# Patient Record
Sex: MALE | Race: WHITE | NOT HISPANIC OR LATINO | Employment: OTHER | URBAN - METROPOLITAN AREA
[De-identification: names, ages, dates, MRNs, and addresses within clinical notes are randomized per-mention and may not be internally consistent; named-entity substitution may affect disease eponyms.]

---

## 2023-10-19 DIAGNOSIS — E78.5 HYPERLIPIDEMIA, UNSPECIFIED HYPERLIPIDEMIA TYPE: ICD-10-CM

## 2023-10-19 DIAGNOSIS — E78.1 HYPERTRIGLYCERIDEMIA: Primary | ICD-10-CM

## 2023-10-19 PROBLEM — R00.1 BRADYCARDIA, SINUS: Status: ACTIVE | Noted: 2023-10-19

## 2023-10-19 PROBLEM — E66.3 OVERWEIGHT: Status: ACTIVE | Noted: 2023-10-19

## 2023-10-19 PROBLEM — I35.1 MODERATE AORTIC INSUFFICIENCY: Status: ACTIVE | Noted: 2023-10-19

## 2023-10-19 PROBLEM — I25.10 CORONARY ARTERY CALCIFICATION: Status: ACTIVE | Noted: 2023-10-19

## 2023-10-19 PROBLEM — I10 HTN (HYPERTENSION), BENIGN: Status: ACTIVE | Noted: 2023-10-19

## 2023-10-19 PROBLEM — I25.84 CORONARY ARTERY CALCIFICATION: Status: ACTIVE | Noted: 2023-10-19

## 2023-10-19 PROBLEM — I77.810 DILATED AORTIC ROOT (CMS-HCC): Status: ACTIVE | Noted: 2023-10-19

## 2023-10-19 PROBLEM — G47.33 OBSTRUCTIVE SLEEP APNEA: Status: ACTIVE | Noted: 2023-10-19

## 2023-10-21 RX ORDER — COLESEVELAM 180 1/1
1250 TABLET ORAL 2 TIMES DAILY
Qty: 360 TABLET | Refills: 0 | Status: SHIPPED | OUTPATIENT
Start: 2023-10-21

## 2023-12-13 PROBLEM — R35.89 POLYURIA: Status: ACTIVE | Noted: 2022-01-14

## 2023-12-13 PROBLEM — K27.9 PUD (PEPTIC ULCER DISEASE): Status: ACTIVE | Noted: 2020-04-21

## 2023-12-13 PROBLEM — K57.90 DIVERTICULOSIS: Status: ACTIVE | Noted: 2023-12-13

## 2023-12-13 PROBLEM — M94.0 COSTOCHONDRITIS: Status: ACTIVE | Noted: 2022-04-21

## 2023-12-13 PROBLEM — Z86.0100 HISTORY OF COLON POLYPS: Status: ACTIVE | Noted: 2023-12-13

## 2023-12-13 PROBLEM — R21 PENILE RASH: Status: ACTIVE | Noted: 2022-04-21

## 2023-12-13 PROBLEM — D36.9 TUBULAR ADENOMA: Status: ACTIVE | Noted: 2023-12-13

## 2023-12-13 PROBLEM — I25.10 ARTERIOSCLEROSIS OF CORONARY ARTERY: Status: ACTIVE | Noted: 2022-11-18

## 2023-12-13 PROBLEM — G47.33 OSA (OBSTRUCTIVE SLEEP APNEA): Status: ACTIVE | Noted: 2022-01-14

## 2023-12-13 PROBLEM — M54.2 NECK PAIN: Status: ACTIVE | Noted: 2022-04-21

## 2023-12-13 PROBLEM — K62.5 RECTAL BLEEDING: Status: ACTIVE | Noted: 2023-12-13

## 2023-12-13 PROBLEM — R91.1 LUNG NODULE: Status: ACTIVE | Noted: 2023-12-13

## 2023-12-13 PROBLEM — I25.84 CORONARY ARTERY CALCIFICATION: Status: ACTIVE | Noted: 2022-11-18

## 2023-12-13 PROBLEM — Z86.010 HISTORY OF COLON POLYPS: Status: ACTIVE | Noted: 2023-12-13

## 2023-12-13 PROBLEM — R35.1 NOCTURIA MORE THAN TWICE PER NIGHT: Status: ACTIVE | Noted: 2018-03-29

## 2023-12-13 PROBLEM — J34.89 NASAL OBSTRUCTION: Status: ACTIVE | Noted: 2023-12-13

## 2023-12-13 PROBLEM — I38 VALVULAR REGURGITATION: Status: ACTIVE | Noted: 2023-03-29

## 2023-12-13 PROBLEM — M19.90 OSTEOARTHRITIS: Status: ACTIVE | Noted: 2020-06-02

## 2023-12-13 PROBLEM — D53.9 NUTRITIONAL ANEMIA, UNSPECIFIED: Status: ACTIVE | Noted: 2020-04-17

## 2023-12-13 PROBLEM — K21.9 GASTROESOPHAGEAL REFLUX DISEASE WITHOUT ESOPHAGITIS: Status: ACTIVE | Noted: 2020-04-06

## 2023-12-13 PROBLEM — I71.43 INFRARENAL ABDOMINAL AORTIC ANEURYSM (AAA) WITHOUT RUPTURE (CMS-HCC): Status: ACTIVE | Noted: 2023-03-29

## 2023-12-13 PROBLEM — N40.0 BENIGN PROSTATIC HYPERPLASIA: Status: ACTIVE | Noted: 2021-05-28

## 2023-12-13 PROBLEM — F41.9 ANXIETY DISORDER: Status: ACTIVE | Noted: 2021-03-25

## 2023-12-13 PROBLEM — R00.2 PALPITATIONS: Status: ACTIVE | Noted: 2023-12-13

## 2023-12-14 ENCOUNTER — OFFICE VISIT (OUTPATIENT)
Dept: CARDIOLOGY | Facility: CLINIC | Age: 83
End: 2023-12-14
Payer: MEDICARE

## 2023-12-14 VITALS
SYSTOLIC BLOOD PRESSURE: 132 MMHG | DIASTOLIC BLOOD PRESSURE: 60 MMHG | WEIGHT: 177 LBS | BODY MASS INDEX: 28.57 KG/M2 | OXYGEN SATURATION: 98 % | HEART RATE: 61 BPM | RESPIRATION RATE: 16 BRPM

## 2023-12-14 DIAGNOSIS — E78.00 PURE HYPERCHOLESTEROLEMIA: ICD-10-CM

## 2023-12-14 DIAGNOSIS — I25.84 CORONARY ARTERY CALCIFICATION: ICD-10-CM

## 2023-12-14 DIAGNOSIS — G47.33 OSA (OBSTRUCTIVE SLEEP APNEA): ICD-10-CM

## 2023-12-14 DIAGNOSIS — I77.810 DILATED AORTIC ROOT (CMS-HCC): ICD-10-CM

## 2023-12-14 DIAGNOSIS — I35.1 MODERATE AORTIC INSUFFICIENCY: ICD-10-CM

## 2023-12-14 DIAGNOSIS — I25.10 CORONARY ARTERY CALCIFICATION: ICD-10-CM

## 2023-12-14 DIAGNOSIS — I71.20 THORACIC AORTIC ANEURYSM WITHOUT RUPTURE, UNSPECIFIED PART (CMS-HCC): ICD-10-CM

## 2023-12-14 DIAGNOSIS — R00.1 BRADYCARDIA, SINUS: Primary | ICD-10-CM

## 2023-12-14 DIAGNOSIS — I10 HTN (HYPERTENSION), BENIGN: ICD-10-CM

## 2023-12-14 DIAGNOSIS — E78.1 HYPERTRIGLYCERIDEMIA: ICD-10-CM

## 2023-12-14 PROCEDURE — 99214 OFFICE O/P EST MOD 30 MIN: CPT | Performed by: INTERNAL MEDICINE

## 2023-12-14 PROCEDURE — 3078F DIAST BP <80 MM HG: CPT | Performed by: INTERNAL MEDICINE

## 2023-12-14 PROCEDURE — 1036F TOBACCO NON-USER: CPT | Performed by: INTERNAL MEDICINE

## 2023-12-14 PROCEDURE — 1159F MED LIST DOCD IN RCRD: CPT | Performed by: INTERNAL MEDICINE

## 2023-12-14 PROCEDURE — 3075F SYST BP GE 130 - 139MM HG: CPT | Performed by: INTERNAL MEDICINE

## 2023-12-14 PROCEDURE — 1160F RVW MEDS BY RX/DR IN RCRD: CPT | Performed by: INTERNAL MEDICINE

## 2023-12-14 PROCEDURE — 1126F AMNT PAIN NOTED NONE PRSNT: CPT | Performed by: INTERNAL MEDICINE

## 2023-12-14 NOTE — PROGRESS NOTES
Chief Complaint:   Follow-up (6 month)     History Of Present Illness:    Sabina Henry is a 83 y.o. male presenting with  CV dz.  Active-walks 1 hour daily    Patient denies chest pain/SOB/palpitations/dizziness/lightheadedness/edema/claudication       Last Recorded Vitals:  Vitals:    12/14/23 1347 12/14/23 1419   BP: 144/70 132/60   BP Location: Left arm    Patient Position: Sitting    Pulse: 61    Resp: 16    SpO2: 98%    Weight: 80.3 kg (177 lb)             Allergies:  Penicillins    Outpatient Medications:  Current Outpatient Medications   Medication Instructions    amLODIPine (NORVASC) 10 mg, oral, Daily    aspirin 81 mg EC tablet 4 tablets, oral, Weekly    Bifidobacterium infantis (Align) 4 mg capsule 1 capsule, oral, Daily    colesevelam (WELCHOL) 1,250 mg, oral, 2 times daily    metoprolol succinate XL (TOPROL-XL) 50 mg, oral, Daily    olmesartan (BENICAR) 40 mg, oral, Daily    omega-3 acid ethyl esters (LOVAZA) 2 g, oral, 2 times daily       Physical Exam:  Constitutional:       Appearance: Healthy appearance. Not in distress.   Neck:      Vascular: No JVR. JVD normal.   Pulmonary:      Effort: Pulmonary effort is normal.      Breath sounds: Normal breath sounds. No wheezing. No rhonchi. No rales.   Chest:      Chest wall: Not tender to palpatation.   Cardiovascular:      PMI at left midclavicular line. Normal rate. Regular rhythm. Normal S1. Normal S2.       Murmurs: There is no murmur.      No gallop.  No click. No rub.   Pulses:     Intact distal pulses.   Edema:     Peripheral edema absent.   Abdominal:      General: Bowel sounds are normal.      Palpations: Abdomen is soft.      Tenderness: There is no abdominal tenderness.   Musculoskeletal: Normal range of motion.         General: No tenderness. Skin:     General: Skin is warm and dry.   Neurological:      General: No focal deficit present.      Mental Status: Alert and oriented to person, place and time.          Last Labs:  CBC -  Lab Results    Component Value Date    WBC 6.5 03/24/2021    HGB 14.1 03/24/2021    HCT 40.9 (L) 03/24/2021    MCV 89 03/24/2021     03/24/2021       CMP -  Lab Results   Component Value Date    CALCIUM 10.3 04/15/2021    PROT 7.4 03/24/2021    ALBUMIN 4.4 03/24/2021    AST 14 03/24/2021    ALT 12 03/24/2021    ALKPHOS 96 03/24/2021    BILITOT 0.4 03/24/2021       LIPID PANEL -   Lab Results   Component Value Date    CHOL 167 05/17/2021    TRIG 192 (H) 05/17/2021    HDL 40.9 05/17/2021    CHHDL 4.1 05/17/2021    LDLF 88 05/17/2021    VLDL 38 05/17/2021       RENAL FUNCTION PANEL -   Lab Results   Component Value Date    GLUCOSE 114 (H) 04/15/2021     04/15/2021    K 4.4 04/15/2021     04/15/2021    CO2 30 04/15/2021    ANIONGAP 10 04/15/2021    BUN 15 04/15/2021    CREATININE 0.88 04/15/2021    CALCIUM 10.3 04/15/2021    ALBUMIN 4.4 03/24/2021        Lab Results   Component Value Date    BNP 74 04/05/2020           Lab review: I have personally reviewed the laboratory result(s)       Problem List Items Addressed This Visit       Bradycardia, sinus - Primary    Overview     Patient with EP/of Mobitz II AV block on event monitor. Had pauses up to 4.5 seconds. Occurred at night. Suspect related to sleep apnea.   Decreased beta blocker   On treatment for sleep apnea          Coronary artery calcification    Overview     Noted initially on 4/2020 chest CT. Reported as severe on 4/2021 chest CT.  Nl 6/2021 stress test.   Denies angina despite good activity level.   On beta blocker.   On ASA.   Has been statin intolerant.  Follow.         Dilated aortic root (CMS/HCC)    Overview     Stable at 4.1 cm per 4/2021 CT   Maintain good BP control         HTN (hypertension), benign    Overview     BP OK in office today   Had stopped hydrochlorothiazide in the past b/c of dehydration / hematuria  Follow          Hyperlipidemia    Overview     Has not tolerated statins - on Welchol and Lovaza ... Declined change in statin in  past   4/2022 LKNPY=621 with VLL=320   12/2022 XNKPS=228, HDL=48-recheck         Hypertriglyceridemia    Overview     On Lovaza ... 4/2022 BI=354         Moderate aortic insufficiency    Overview     Noted on 8/2017 echo. Stable per 4/2021 study.  8/2023 echo with NL LV size/systolic fxn and moderate AI         LYN (obstructive sleep apnea)    Overview     Severe per 5/2021 sleep study  Wearing CPAP         Thoracic aortic aneurysm (TAA) (CMS/HCC)    Overview     4.1 CM per 4/2021 CT  Stable at 4.1 cm per 8/2023 echo  Follow             Stay active  Bring in cholesterol readings    Santy Awad, DO

## 2024-01-15 DIAGNOSIS — E78.5 HYPERLIPIDEMIA, UNSPECIFIED: ICD-10-CM

## 2024-01-15 RX ORDER — OMEGA-3-ACID ETHYL ESTERS 1 G/1
2 CAPSULE, LIQUID FILLED ORAL 2 TIMES DAILY
Qty: 90 CAPSULE | Refills: 3 | Status: SHIPPED | OUTPATIENT
Start: 2024-01-15

## 2024-06-17 ENCOUNTER — APPOINTMENT (OUTPATIENT)
Dept: CARDIOLOGY | Facility: CLINIC | Age: 84
End: 2024-06-17
Payer: MEDICARE

## 2024-06-17 PROBLEM — G89.29 CHRONIC RIGHT SHOULDER PAIN: Status: ACTIVE | Noted: 2024-03-22

## 2024-06-17 PROBLEM — E66.9 OBESITY: Status: ACTIVE | Noted: 2023-12-20

## 2024-06-17 PROBLEM — M25.511 CHRONIC RIGHT SHOULDER PAIN: Status: ACTIVE | Noted: 2024-03-22

## 2024-07-02 ENCOUNTER — APPOINTMENT (OUTPATIENT)
Dept: CARDIOLOGY | Facility: CLINIC | Age: 84
End: 2024-07-02
Payer: MEDICARE

## 2024-07-02 VITALS
DIASTOLIC BLOOD PRESSURE: 58 MMHG | SYSTOLIC BLOOD PRESSURE: 155 MMHG | OXYGEN SATURATION: 97 % | WEIGHT: 178 LBS | BODY MASS INDEX: 28.73 KG/M2 | HEART RATE: 60 BPM

## 2024-07-02 DIAGNOSIS — G47.33 OSA (OBSTRUCTIVE SLEEP APNEA): ICD-10-CM

## 2024-07-02 DIAGNOSIS — R00.1 BRADYCARDIA, SINUS: Primary | ICD-10-CM

## 2024-07-02 DIAGNOSIS — I35.1 MODERATE AORTIC INSUFFICIENCY: ICD-10-CM

## 2024-07-02 DIAGNOSIS — I10 HTN (HYPERTENSION), BENIGN: ICD-10-CM

## 2024-07-02 DIAGNOSIS — E78.00 PURE HYPERCHOLESTEROLEMIA: ICD-10-CM

## 2024-07-02 DIAGNOSIS — I71.20 THORACIC AORTIC ANEURYSM WITHOUT RUPTURE, UNSPECIFIED PART (CMS-HCC): ICD-10-CM

## 2024-07-02 DIAGNOSIS — E78.5 HYPERLIPIDEMIA, UNSPECIFIED: ICD-10-CM

## 2024-07-02 DIAGNOSIS — I25.10 CORONARY ARTERY CALCIFICATION: ICD-10-CM

## 2024-07-02 DIAGNOSIS — I77.810 DILATED AORTIC ROOT (CMS-HCC): ICD-10-CM

## 2024-07-02 DIAGNOSIS — E78.1 HYPERTRIGLYCERIDEMIA: ICD-10-CM

## 2024-07-02 DIAGNOSIS — I25.84 CORONARY ARTERY CALCIFICATION: ICD-10-CM

## 2024-07-02 PROCEDURE — 93000 ELECTROCARDIOGRAM COMPLETE: CPT | Performed by: INTERNAL MEDICINE

## 2024-07-02 PROCEDURE — 1159F MED LIST DOCD IN RCRD: CPT | Performed by: INTERNAL MEDICINE

## 2024-07-02 PROCEDURE — 1036F TOBACCO NON-USER: CPT | Performed by: INTERNAL MEDICINE

## 2024-07-02 PROCEDURE — 99214 OFFICE O/P EST MOD 30 MIN: CPT | Performed by: INTERNAL MEDICINE

## 2024-07-02 PROCEDURE — 1160F RVW MEDS BY RX/DR IN RCRD: CPT | Performed by: INTERNAL MEDICINE

## 2024-07-02 PROCEDURE — 3078F DIAST BP <80 MM HG: CPT | Performed by: INTERNAL MEDICINE

## 2024-07-02 PROCEDURE — 3077F SYST BP >= 140 MM HG: CPT | Performed by: INTERNAL MEDICINE

## 2024-07-02 RX ORDER — CLOTRIMAZOLE AND BETAMETHASONE DIPROPIONATE 10; .64 MG/G; MG/G
CREAM TOPICAL
COMMUNITY
Start: 2024-06-21

## 2024-07-02 RX ORDER — FLUTICASONE PROPIONATE 0.5 MG/G
CREAM TOPICAL
COMMUNITY
Start: 2024-06-21

## 2024-07-02 RX ORDER — FUROSEMIDE 20 MG/1
1 TABLET ORAL
COMMUNITY
Start: 2024-03-25 | End: 2024-07-02 | Stop reason: WASHOUT

## 2024-07-02 RX ORDER — OMEGA-3-ACID ETHYL ESTERS 1 G/1
2 CAPSULE, LIQUID FILLED ORAL 2 TIMES DAILY
Qty: 360 CAPSULE | Refills: 3 | Status: SHIPPED | OUTPATIENT
Start: 2024-07-02 | End: 2025-07-02

## 2024-07-02 NOTE — PROGRESS NOTES
Chief Complaint:   Follow-up (Patient is here for a follow up )     History Of Present Illness:    Sabina Henry is a 84 y.o. male presenting with  CV dz.  Active-walks 1 hour daily  Feels good    Patient denies chest pain/SOB/palpitations/dizziness/lightheadedness/edema/claudication       Last Recorded Vitals:  Vitals:    07/02/24 1448 07/02/24 1526   BP: 162/71 155/58   BP Location: Left arm    Patient Position: Sitting    BP Cuff Size: Adult    Pulse: 60    SpO2: 97%    Weight: 80.7 kg (178 lb)             Allergies:  Penicillins    Outpatient Medications:  Current Outpatient Medications   Medication Instructions    amLODIPine (NORVASC) 10 mg, oral, Daily    aspirin 81 mg EC tablet 4 tablets, oral, Once Weekly    clotrimazole-betamethasone (Lotrisone) cream apply a thin layer to affected area 2 times a day    colesevelam (WELCHOL) 1,250 mg, oral, 2 times daily    fluticasone (Cutivate) 0.05 % cream Apply topically to affected area 2 times daily    metoprolol succinate XL (TOPROL-XL) 50 mg, oral, Daily    olmesartan (BENICAR) 40 mg, oral, Daily    omega-3 acid ethyl esters (LOVAZA) 2 g, oral, 2 times daily       Physical Exam:  Constitutional:       Appearance: Healthy appearance. Not in distress.   Neck:      Vascular: No JVR. JVD normal.   Pulmonary:      Effort: Pulmonary effort is normal.      Breath sounds: Normal breath sounds. No wheezing. No rhonchi. No rales.   Chest:      Chest wall: Not tender to palpatation.   Cardiovascular:      PMI at left midclavicular line. Normal rate. Regular rhythm. Normal S1. Normal S2.       Murmurs: There is no murmur.      No gallop.  No click. No rub.   Pulses:     Intact distal pulses.   Edema:     Peripheral edema absent.   Abdominal:      General: Bowel sounds are normal.      Palpations: Abdomen is soft.      Tenderness: There is no abdominal tenderness.   Musculoskeletal: Normal range of motion.         General: No tenderness. Skin:     General: Skin is warm and dry.    Neurological:      General: No focal deficit present.      Mental Status: Alert and oriented to person, place and time.          Last Labs:  CBC -  Lab Results   Component Value Date    WBC 6.5 03/24/2021    HGB 14.1 03/24/2021    HCT 40.9 (L) 03/24/2021    MCV 89 03/24/2021     03/24/2021       CMP -  Lab Results   Component Value Date    CALCIUM 10.3 04/15/2021    PROT 7.4 03/24/2021    ALBUMIN 4.4 03/24/2021    AST 14 03/24/2021    ALT 12 03/24/2021    ALKPHOS 96 03/24/2021    BILITOT 0.4 03/24/2021       LIPID PANEL -   Lab Results   Component Value Date    CHOL 167 05/17/2021    TRIG 192 (H) 05/17/2021    HDL 40.9 05/17/2021    CHHDL 4.1 05/17/2021    LDLF 88 05/17/2021    VLDL 38 05/17/2021 5/2024  T 151  H 49      RENAL FUNCTION PANEL -   Lab Results   Component Value Date    GLUCOSE 114 (H) 04/15/2021     04/15/2021    K 4.4 04/15/2021     04/15/2021    CO2 30 04/15/2021    ANIONGAP 10 04/15/2021    BUN 15 04/15/2021    CREATININE 0.88 04/15/2021    CALCIUM 10.3 04/15/2021    ALBUMIN 4.4 03/24/2021        Lab Results   Component Value Date    BNP 74 04/05/2020           Lab review: I have personally reviewed the laboratory result(s)       Problem List Items Addressed This Visit       Bradycardia, sinus - Primary    Overview     Patient with EP/of Mobitz II AV block on event monitor. Had pauses up to 4.5 seconds. Occurred at night. Suspect related to sleep apnea.   Decreased beta blocker   On treatment for sleep apnea          Coronary artery calcification    Overview     Noted initially on 4/2020 chest CT. Reported as severe on 4/2021 chest CT.  NL 6/2021 stress test.   Denies angina despite good activity level.   On beta blocker.   On ASA.   Has been statin intolerant.  Follow.         Relevant Orders    ECG 12 Lead (Completed)    HTN (hypertension), benign    Overview     BP elevated in office today however generally lower at home thus NO med changes  Had stopped hydrochlorothiazide  in the past b/c of dehydration / hematuria  Follow          Hyperlipidemia    Overview     Has not tolerated statins - on Welchol and Lovaza ... Declined change in statin in past   4/2022 ZBSNN=432 with TDM=883   12/2022 EWGBA=459, HDL=48  5/2024 screening with BXNSN=047         Hypertriglyceridemia    Overview     On Lovaza ... 4/2022 MR=600         Moderate aortic insufficiency    Overview     Noted on 8/2017 echo. Stable per 4/2021 study.  8/2023 echo with NL LV size/systolic fxn and moderate AINo murmur on exam         LYN (obstructive sleep apnea)    Overview     Severe per 5/2021 sleep study  Wearing CPAP         Thoracic aortic aneurysm (TAA) (CMS-HCC)    Overview     4.1 CM per 4/2021 CT  Stable at 4.1 cm per 8/2023 echo  Follow           Other Visit Diagnoses       Dilated aortic root (CMS-HCC)        Hyperlipidemia, unspecified                Stay active      Santy Awad, DO

## 2024-08-31 ENCOUNTER — HOSPITAL ENCOUNTER (EMERGENCY)
Facility: HOSPITAL | Age: 84
Discharge: HOME | End: 2024-09-01
Payer: MEDICARE

## 2024-08-31 DIAGNOSIS — T50.901A ACCIDENTAL DRUG OVERDOSE, INITIAL ENCOUNTER: Primary | ICD-10-CM

## 2024-08-31 PROCEDURE — 99283 EMERGENCY DEPT VISIT LOW MDM: CPT

## 2024-08-31 ASSESSMENT — COLUMBIA-SUICIDE SEVERITY RATING SCALE - C-SSRS
6. HAVE YOU EVER DONE ANYTHING, STARTED TO DO ANYTHING, OR PREPARED TO DO ANYTHING TO END YOUR LIFE?: NO
1. IN THE PAST MONTH, HAVE YOU WISHED YOU WERE DEAD OR WISHED YOU COULD GO TO SLEEP AND NOT WAKE UP?: NO
2. HAVE YOU ACTUALLY HAD ANY THOUGHTS OF KILLING YOURSELF?: NO

## 2024-08-31 ASSESSMENT — PAIN SCALES - GENERAL: PAINLEVEL_OUTOF10: 0 - NO PAIN

## 2024-08-31 ASSESSMENT — PAIN - FUNCTIONAL ASSESSMENT: PAIN_FUNCTIONAL_ASSESSMENT: 0-10

## 2024-09-01 VITALS
RESPIRATION RATE: 17 BRPM | HEART RATE: 62 BPM | SYSTOLIC BLOOD PRESSURE: 146 MMHG | HEIGHT: 65 IN | DIASTOLIC BLOOD PRESSURE: 72 MMHG | BODY MASS INDEX: 28.66 KG/M2 | WEIGHT: 172 LBS | TEMPERATURE: 98.2 F | OXYGEN SATURATION: 97 %

## 2024-09-01 ASSESSMENT — LIFESTYLE VARIABLES
EVER FELT BAD OR GUILTY ABOUT YOUR DRINKING: NO
EVER HAD A DRINK FIRST THING IN THE MORNING TO STEADY YOUR NERVES TO GET RID OF A HANGOVER: NO
HAVE PEOPLE ANNOYED YOU BY CRITICIZING YOUR DRINKING: NO
HAVE YOU EVER FELT YOU SHOULD CUT DOWN ON YOUR DRINKING: NO
TOTAL SCORE: 0

## 2024-09-01 ASSESSMENT — PAIN SCALES - GENERAL
PAINLEVEL_OUTOF10: 0 - NO PAIN
PAINLEVEL_OUTOF10: 0 - NO PAIN

## 2024-09-01 NOTE — ED TRIAGE NOTES
85 y/o male concerned because he took 2-625mg tablets of colesevelam at 2000 hours tonight and then took same dose at 2300 hours tonight by mistake. Patient denies any complaints at time of triage.

## 2024-09-01 NOTE — ED PROVIDER NOTES
HPI   Chief Complaint   Patient presents with    Drug Overdose       84-year-old male presents for an unintentional drug overdose.  Patient states that he took an extra dose of his cholesterol medication this evening accidentally.  He reports he took his first dose around 8 PM and his second dose around 11 PM.  He reports feeling well.  He states that he is supposed to take the medication once every 12 hours.              Patient History   No past medical history on file.  Past Surgical History:   Procedure Laterality Date    CT ANGIO CORONARY ART WITH HEARTFLOW IF SCORE >30%  11/18/2022    CT ANGIO CORONARY ART WITH HEARTFLOW IF SCORE >30% 11/18/2022    HERNIA REPAIR  07/13/2018    Hernia Repair    OTHER SURGICAL HISTORY  06/10/2022    Endoscopy    OTHER SURGICAL HISTORY  05/13/2021    Colonoscopy complete for polypectomy    TONSILLECTOMY  07/13/2018    Tonsillectomy    TOTAL HIP ARTHROPLASTY  07/13/2018    Hip Replacement     No family history on file.  Social History     Tobacco Use    Smoking status: Never    Smokeless tobacco: Never   Substance Use Topics    Alcohol use: Not on file    Drug use: Not on file       Physical Exam   ED Triage Vitals [08/31/24 2344]   Temperature Heart Rate Respirations BP   36.8 °C (98.2 °F) 72 16 (!) 184/81      Pulse Ox Temp Source Heart Rate Source Patient Position   99 % Temporal Monitor Sitting      BP Location FiO2 (%)     Right arm --       Physical Exam  Constitutional:       General: He is not in acute distress.     Appearance: Normal appearance. He is normal weight. He is not ill-appearing, toxic-appearing or diaphoretic.   HENT:      Head: Normocephalic.      Nose: Nose normal.      Mouth/Throat:      Mouth: Mucous membranes are moist.   Cardiovascular:      Rate and Rhythm: Normal rate.      Pulses: Normal pulses.      Heart sounds: Normal heart sounds.   Pulmonary:      Effort: Pulmonary effort is normal.      Breath sounds: Normal breath sounds.   Abdominal:       General: There is no distension.      Palpations: Abdomen is soft.      Tenderness: There is no abdominal tenderness.   Skin:     General: Skin is warm and dry.      Capillary Refill: Capillary refill takes less than 2 seconds.   Neurological:      General: No focal deficit present.      Mental Status: He is alert.   Psychiatric:         Mood and Affect: Mood normal.         Behavior: Behavior normal.           ED Course & MDM                  No data recorded     [place] Coma Scale Score: 15 (08/31/24 2345 : Shawn Stewart RN)                           Medical Decision Making  Poison control was contacted and recommended reassurance.  Patient may develop mild GI symptoms.  This was discussed at length.  I recommended that the patient skip his next dose which is scheduled for this morning.  Recommended following up closely with his primary care physician.  Encouraged patient to use caution when taking his medications and recommended using a pillbox        Procedure  Procedures     Rj Beasley PA-C  09/01/24 0006

## 2024-12-19 PROBLEM — I77.810 AORTIC ROOT DILATATION (CMS-HCC): Status: ACTIVE | Noted: 2024-12-19

## 2024-12-19 PROBLEM — E66.9 OBESITY: Status: RESOLVED | Noted: 2023-12-20 | Resolved: 2024-12-19

## 2024-12-19 PROBLEM — E66.3 OVERWEIGHT: Status: ACTIVE | Noted: 2024-12-19

## 2025-01-07 ENCOUNTER — APPOINTMENT (OUTPATIENT)
Dept: CARDIOLOGY | Facility: CLINIC | Age: 85
End: 2025-01-07
Payer: MEDICARE

## 2025-01-08 ENCOUNTER — APPOINTMENT (OUTPATIENT)
Dept: CARDIOLOGY | Facility: CLINIC | Age: 85
End: 2025-01-08
Payer: MEDICARE

## 2025-01-08 VITALS
BODY MASS INDEX: 30.39 KG/M2 | OXYGEN SATURATION: 98 % | HEIGHT: 65 IN | SYSTOLIC BLOOD PRESSURE: 125 MMHG | WEIGHT: 182.4 LBS | DIASTOLIC BLOOD PRESSURE: 60 MMHG | HEART RATE: 64 BPM

## 2025-01-08 DIAGNOSIS — I77.810 AORTIC ROOT DILATATION (CMS-HCC): Primary | ICD-10-CM

## 2025-01-08 DIAGNOSIS — G47.33 OSA (OBSTRUCTIVE SLEEP APNEA): ICD-10-CM

## 2025-01-08 DIAGNOSIS — I25.10 CORONARY ARTERY CALCIFICATION: ICD-10-CM

## 2025-01-08 DIAGNOSIS — I71.20 THORACIC AORTIC ANEURYSM WITHOUT RUPTURE, UNSPECIFIED PART (CMS-HCC): ICD-10-CM

## 2025-01-08 DIAGNOSIS — I10 HTN (HYPERTENSION), BENIGN: ICD-10-CM

## 2025-01-08 DIAGNOSIS — R00.1 BRADYCARDIA, SINUS: ICD-10-CM

## 2025-01-08 DIAGNOSIS — I35.1 MODERATE AORTIC INSUFFICIENCY: ICD-10-CM

## 2025-01-08 DIAGNOSIS — E78.00 PURE HYPERCHOLESTEROLEMIA: ICD-10-CM

## 2025-01-08 PROCEDURE — 3074F SYST BP LT 130 MM HG: CPT | Performed by: INTERNAL MEDICINE

## 2025-01-08 PROCEDURE — 1159F MED LIST DOCD IN RCRD: CPT | Performed by: INTERNAL MEDICINE

## 2025-01-08 PROCEDURE — 99214 OFFICE O/P EST MOD 30 MIN: CPT | Performed by: INTERNAL MEDICINE

## 2025-01-08 PROCEDURE — 3078F DIAST BP <80 MM HG: CPT | Performed by: INTERNAL MEDICINE

## 2025-01-08 PROCEDURE — G2211 COMPLEX E/M VISIT ADD ON: HCPCS | Performed by: INTERNAL MEDICINE

## 2025-01-08 NOTE — PROGRESS NOTES
"Chief Complaint:   Follow-up (6 month)     History Of Present Illness:    Sabina Henry is a 84 y.o. male presenting with  CV dz.  Active-2 miles daily  Feels good    Patient denies chest pain/SOB/palpitations/dizziness/lightheadedness/edema/claudication       Last Recorded Vitals:  Vitals:    01/08/25 0956 01/08/25 1015   BP: 132/64 125/60   BP Location: Left arm    Patient Position: Sitting    BP Cuff Size: Adult    Pulse: 64    SpO2: 98%    Weight: 82.7 kg (182 lb 6.4 oz)    Height: 1.651 m (5' 5\")             Allergies:  Penicillins    Outpatient Medications:  Current Outpatient Medications   Medication Instructions    amLODIPine (NORVASC) 10 mg, Daily    aspirin 81 mg EC tablet 4 tablets, Once Weekly    colesevelam (WELCHOL) 1,250 mg, oral, 2 times daily    metoprolol succinate XL (TOPROL-XL) 50 mg, Daily    olmesartan (BENICAR) 40 mg, Daily    omega-3 acid ethyl esters (LOVAZA) 2 g, oral, 2 times daily       Physical Exam:  Constitutional:       Appearance: Healthy appearance. Not in distress.   Neck:      Vascular: No JVR. JVD normal.   Pulmonary:      Effort: Pulmonary effort is normal.      Breath sounds: Normal breath sounds. No wheezing. No rhonchi. No rales.   Chest:      Chest wall: Not tender to palpatation.   Cardiovascular:      PMI at left midclavicular line. Normal rate. Regular rhythm. Normal S1. Normal S2.       Murmurs: There is no murmur.      No gallop.  No click. No rub.   Pulses:     Intact distal pulses.   Edema:     Peripheral edema absent.   Abdominal:      General: Bowel sounds are normal.      Palpations: Abdomen is soft.      Tenderness: There is no abdominal tenderness.   Musculoskeletal: Normal range of motion.         General: No tenderness. Skin:     General: Skin is warm and dry.   Neurological:      General: No focal deficit present.      Mental Status: Alert and oriented to person, place and time.          Last Labs:  CBC -  Lab Results   Component Value Date    WBC 6.5 " 03/24/2021    HGB 14.1 03/24/2021    HCT 40.9 (L) 03/24/2021    MCV 89 03/24/2021     03/24/2021       CMP -  Lab Results   Component Value Date    CALCIUM 10.3 04/15/2021    PROT 7.4 03/24/2021    ALBUMIN 4.4 03/24/2021    AST 14 03/24/2021    ALT 12 03/24/2021    ALKPHOS 96 03/24/2021    BILITOT 0.4 03/24/2021       LIPID PANEL -   Lab Results   Component Value Date    CHOL 167 05/17/2021    TRIG 192 (H) 05/17/2021    HDL 40.9 05/17/2021    CHHDL 4.1 05/17/2021    LDLF 88 05/17/2021    VLDL 38 05/17/2021 11/2024  T=204  Hdl=43  Yy=372  Hot=185    RENAL FUNCTION PANEL -   Lab Results   Component Value Date    GLUCOSE 114 (H) 04/15/2021     04/15/2021    K 4.4 04/15/2021     04/15/2021    CO2 30 04/15/2021    ANIONGAP 10 04/15/2021    BUN 15 04/15/2021    CREATININE 0.88 04/15/2021    CALCIUM 10.3 04/15/2021    ALBUMIN 4.4 03/24/2021        Lab Results   Component Value Date    BNP 74 04/05/2020           Lab review: I have personally reviewed the laboratory result(s)       Problem List Items Addressed This Visit       Bradycardia, sinus    Overview     Patient with EP/of Mobitz II AV block on event monitor. Had pauses up to 4.5 seconds. Occurred at night. Suspect related to sleep apnea.   Decreased beta blocker   On treatment for sleep apnea          Coronary artery calcification    Overview     Noted initially on 4/2020 chest CT. Reported as severe on 4/2021 chest CT.  NL 6/2021 stress test.   Denies angina despite good activity level.   On beta blocker.   On ASA.   Has been statin intolerant.  Follow.         HTN (hypertension), benign    Overview     BP OK  Had stopped hydrochlorothiazide in the past b/c of dehydration / hematuria  Follow          Hyperlipidemia    Overview     Has not tolerated statins - on Welchol and Lovaza ... Declined change in statin in past   4/2022 SFDJY=677 with YFV=110   12/2022 WEMNH=750, HDL=48  5/2024 screening with WAZAW=765  11/2024 SVQ=604  Weight loss/HH  diet         Moderate aortic insufficiency    Overview     Noted on 8/2017 echo. Stable per 4/2021 study.  8/2023 echo with NL LV size/systolic fxn and moderate AI  No murmur on exam         LYN (obstructive sleep apnea)    Overview     Severe per 5/2021 sleep study  Wearing CPAP         Thoracic aortic aneurysm (TAA) (CMS-HCC)    Overview     4.1 CM per 4/2021 CT  Stable at 4.1 cm per 8/2023 echo  Follow           Other Visit Diagnoses       Aortic root dilatation (CMS-[facility])    -  Primary            Stay active  Weight loss      Santy Awad, DO

## 2025-06-09 PROBLEM — E66.811 CLASS 1 OBESITY WITH BODY MASS INDEX (BMI) OF 30.0 TO 30.9 IN ADULT: Status: ACTIVE | Noted: 2024-12-19

## 2025-06-14 ENCOUNTER — APPOINTMENT (OUTPATIENT)
Dept: CARDIOLOGY | Facility: HOSPITAL | Age: 85
End: 2025-06-14
Payer: MEDICARE

## 2025-06-14 PROCEDURE — 93005 ELECTROCARDIOGRAM TRACING: CPT

## 2025-06-14 PROCEDURE — 99285 EMERGENCY DEPT VISIT HI MDM: CPT | Performed by: EMERGENCY MEDICINE

## 2025-06-14 ASSESSMENT — PAIN - FUNCTIONAL ASSESSMENT: PAIN_FUNCTIONAL_ASSESSMENT: 0-10

## 2025-06-14 ASSESSMENT — PAIN SCALES - GENERAL: PAINLEVEL_OUTOF10: 0 - NO PAIN

## 2025-06-15 ENCOUNTER — APPOINTMENT (OUTPATIENT)
Dept: RADIOLOGY | Facility: HOSPITAL | Age: 85
End: 2025-06-15
Payer: MEDICARE

## 2025-06-15 ENCOUNTER — HOSPITAL ENCOUNTER (EMERGENCY)
Facility: HOSPITAL | Age: 85
Discharge: HOME | End: 2025-06-15
Attending: EMERGENCY MEDICINE
Payer: MEDICARE

## 2025-06-15 VITALS
HEART RATE: 49 BPM | DIASTOLIC BLOOD PRESSURE: 65 MMHG | OXYGEN SATURATION: 96 % | HEIGHT: 65 IN | RESPIRATION RATE: 20 BRPM | BODY MASS INDEX: 29.16 KG/M2 | TEMPERATURE: 97.2 F | SYSTOLIC BLOOD PRESSURE: 142 MMHG | WEIGHT: 175 LBS

## 2025-06-15 DIAGNOSIS — R00.2 PALPITATION: Primary | ICD-10-CM

## 2025-06-15 LAB
ALBUMIN SERPL BCP-MCNC: 4.6 G/DL (ref 3.4–5)
ALP SERPL-CCNC: 79 U/L (ref 33–136)
ALT SERPL W P-5'-P-CCNC: 11 U/L (ref 10–52)
ANION GAP SERPL CALC-SCNC: 11 MMOL/L (ref 10–20)
AST SERPL W P-5'-P-CCNC: 18 U/L (ref 9–39)
BASOPHILS # BLD AUTO: 0.1 X10*3/UL (ref 0–0.1)
BASOPHILS NFR BLD AUTO: 1.5 %
BILIRUB SERPL-MCNC: 0.6 MG/DL (ref 0–1.2)
BNP SERPL-MCNC: 160 PG/ML (ref 0–99)
BUN SERPL-MCNC: 16 MG/DL (ref 6–23)
CALCIUM SERPL-MCNC: 9.6 MG/DL (ref 8.6–10.3)
CARDIAC TROPONIN I PNL SERPL HS: 10 NG/L (ref 0–20)
CARDIAC TROPONIN I PNL SERPL HS: 8 NG/L (ref 0–20)
CHLORIDE SERPL-SCNC: 97 MMOL/L (ref 98–107)
CO2 SERPL-SCNC: 26 MMOL/L (ref 21–32)
CREAT SERPL-MCNC: 1.06 MG/DL (ref 0.5–1.3)
EGFRCR SERPLBLD CKD-EPI 2021: 69 ML/MIN/1.73M*2
EOSINOPHIL # BLD AUTO: 0.08 X10*3/UL (ref 0–0.4)
EOSINOPHIL NFR BLD AUTO: 1.2 %
ERYTHROCYTE [DISTWIDTH] IN BLOOD BY AUTOMATED COUNT: 12.6 % (ref 11.5–14.5)
GLUCOSE SERPL-MCNC: 118 MG/DL (ref 74–99)
HCT VFR BLD AUTO: 41.3 % (ref 41–52)
HGB BLD-MCNC: 13.8 G/DL (ref 13.5–17.5)
IMM GRANULOCYTES # BLD AUTO: 0.02 X10*3/UL (ref 0–0.5)
IMM GRANULOCYTES NFR BLD AUTO: 0.3 % (ref 0–0.9)
LYMPHOCYTES # BLD AUTO: 1.74 X10*3/UL (ref 0.8–3)
LYMPHOCYTES NFR BLD AUTO: 26.3 %
MAGNESIUM SERPL-MCNC: 1.9 MG/DL (ref 1.6–2.4)
MCH RBC QN AUTO: 30.9 PG (ref 26–34)
MCHC RBC AUTO-ENTMCNC: 33.4 G/DL (ref 32–36)
MCV RBC AUTO: 92 FL (ref 80–100)
MONOCYTES # BLD AUTO: 0.93 X10*3/UL (ref 0.05–0.8)
MONOCYTES NFR BLD AUTO: 14 %
NEUTROPHILS # BLD AUTO: 3.75 X10*3/UL (ref 1.6–5.5)
NEUTROPHILS NFR BLD AUTO: 56.7 %
NRBC BLD-RTO: 0 /100 WBCS (ref 0–0)
PLATELET # BLD AUTO: 204 X10*3/UL (ref 150–450)
POTASSIUM SERPL-SCNC: 4.3 MMOL/L (ref 3.5–5.3)
PROT SERPL-MCNC: 7.8 G/DL (ref 6.4–8.2)
RBC # BLD AUTO: 4.47 X10*6/UL (ref 4.5–5.9)
SODIUM SERPL-SCNC: 130 MMOL/L (ref 136–145)
WBC # BLD AUTO: 6.6 X10*3/UL (ref 4.4–11.3)

## 2025-06-15 PROCEDURE — 71046 X-RAY EXAM CHEST 2 VIEWS: CPT

## 2025-06-15 PROCEDURE — 84484 ASSAY OF TROPONIN QUANT: CPT | Performed by: EMERGENCY MEDICINE

## 2025-06-15 PROCEDURE — 83880 ASSAY OF NATRIURETIC PEPTIDE: CPT | Performed by: EMERGENCY MEDICINE

## 2025-06-15 PROCEDURE — 36415 COLL VENOUS BLD VENIPUNCTURE: CPT | Performed by: EMERGENCY MEDICINE

## 2025-06-15 PROCEDURE — 71046 X-RAY EXAM CHEST 2 VIEWS: CPT | Performed by: RADIOLOGY

## 2025-06-15 PROCEDURE — 83735 ASSAY OF MAGNESIUM: CPT | Performed by: EMERGENCY MEDICINE

## 2025-06-15 PROCEDURE — 85025 COMPLETE CBC W/AUTO DIFF WBC: CPT | Performed by: EMERGENCY MEDICINE

## 2025-06-15 PROCEDURE — 80053 COMPREHEN METABOLIC PANEL: CPT | Performed by: EMERGENCY MEDICINE

## 2025-06-15 ASSESSMENT — LIFESTYLE VARIABLES
EVER HAD A DRINK FIRST THING IN THE MORNING TO STEADY YOUR NERVES TO GET RID OF A HANGOVER: NO
TOTAL SCORE: 0
HAVE YOU EVER FELT YOU SHOULD CUT DOWN ON YOUR DRINKING: NO
EVER FELT BAD OR GUILTY ABOUT YOUR DRINKING: NO
HAVE PEOPLE ANNOYED YOU BY CRITICIZING YOUR DRINKING: NO

## 2025-06-15 ASSESSMENT — PAIN SCALES - GENERAL: PAINLEVEL_OUTOF10: 0 - NO PAIN

## 2025-06-15 NOTE — DISCHARGE INSTRUCTIONS
Please follow-up with your primary care provider in 2 to 3 days.  Return to the emergency department if develop any worsening symptoms such as recurrent palpitation, chest pain, shortness of breath, weakness, numbness or if concerns arise peer

## 2025-06-15 NOTE — ED TRIAGE NOTES
Patient presents to ED with complaints of palpitations. Patient states he was going to bed and laid down and felt the palpitations. Patient states he took a dose of antianxiety medication and the palpitations have seemed to subsided.  Patient denies shortness of breath or chest pain.

## 2025-06-15 NOTE — ED PROVIDER NOTES
HPI   Chief Complaint   Patient presents with   • Palpitations       This is an 85 years old male patient presented to the emergency department with a chief complaint of palpitation.  He went to bed and he started to feel racing and has chest.  Stated that he took his anxiety medication with no relief and that is when he decided to check and to the emergency department.  Denies any headaches, lightheadedness, loss of consciousness, chest pain, shortness of breath, cough, abdominal pain, fever, chills, body aches, urinary symptoms or recent diarrhea.  Denies weakness or focal numbness.    Review of systems: As stated above in the HPI section              Patient History   Medical History[1]  Surgical History[2]  Family History[3]  Social History[4]    Physical Exam   ED Triage Vitals [06/14/25 2325]   Temperature Heart Rate Respirations BP   36.2 °C (97.2 °F) 67 18 169/72      Pulse Ox Temp Source Heart Rate Source Patient Position   100 % Temporal Monitor Sitting      BP Location FiO2 (%)     Right arm --       Physical Exam  Vitals and nursing note reviewed.   Constitutional:       Appearance: Normal appearance.   HENT:      Head: Normocephalic and atraumatic.      Mouth/Throat:      Mouth: Mucous membranes are moist.   Eyes:      Extraocular Movements: Extraocular movements intact.      Pupils: Pupils are equal, round, and reactive to light.   Cardiovascular:      Rate and Rhythm: Normal rate and regular rhythm.      Heart sounds: No murmur heard.  Abdominal:      General: Abdomen is flat. There is no distension.      Tenderness: There is no abdominal tenderness. There is no right CVA tenderness, left CVA tenderness, guarding or rebound.   Musculoskeletal:         General: Normal range of motion.      Cervical back: Normal range of motion.   Skin:     General: Skin is warm and dry.      Capillary Refill: Capillary refill takes less than 2 seconds.   Neurological:      General: No focal deficit present.      Mental  Status: He is alert. Mental status is at baseline.   Psychiatric:         Mood and Affect: Mood normal.           ED Course & MDM   ED Course as of 06/15/25 0311   Sat Jun 14, 2025   2335 EKG revealed first-degree AV block, rate is 60 bpm, MI is significantly prolonged at 544 ms, normal QRS and normal QTc duration.  No ST segment or T wave pathology.  Good R wave progression throughout the precordial leads. [ME]      ED Course User Index  [ME] Jeff Burkett DO         Diagnoses as of 06/15/25 0311   Palpitation                 No data recorded     [place] Coma Scale Score: 15 (06/15/25 0227 : Monica Ramos RN)                           Medical Decision Making  Patient seen and examined, given the palpitation will obtain basic labs, cardiac workup, and electrolytes.    EKG as documented in my ED course section    Patient stated that the palpitations subsided upon arrival to the emergency department.    If the workup is unremarkable we will be able to discharge the patient home to follow-up with a primary care provider and to return to the ED if alarming symptoms arise as per discharge instruction    Ambulatory pulse oximetry is 99% on room air with heart rate of 75 bpm.  Patient remained asymptomatic and the cardiac workup was reassuring.  Chest x-ray is unremarkable.  He is discharged home to follow-up with a primary care provider with instruction to return to the ED if alarming symptoms arise as per discharge instruction.        Procedure  Procedures           [1]  No past medical history on file.  [2]  Past Surgical History:  Procedure Laterality Date   • CT ANGIO CORONARY ART WITH HEARTFLOW IF SCORE >30%  11/18/2022    CT ANGIO CORONARY ART WITH HEARTFLOW IF SCORE >30% 11/18/2022   • HERNIA REPAIR  07/13/2018    Hernia Repair   • OTHER SURGICAL HISTORY  06/10/2022    Endoscopy   • OTHER SURGICAL HISTORY  05/13/2021    Colonoscopy complete for polypectomy   • TONSILLECTOMY  07/13/2018    Tonsillectomy   •  TOTAL HIP ARTHROPLASTY  07/13/2018    Hip Replacement   [3]  No family history on file.  [4]  Social History  Tobacco Use   • Smoking status: Never   • Smokeless tobacco: Never   Substance Use Topics   • Alcohol use: Not on file   • Drug use: Never      Jeff Burkett DO  06/15/25 0311

## 2025-06-16 LAB
ATRIAL RATE: 60 BPM
P AXIS: 54 DEGREES
PR INTERVAL: 544 MS
Q ONSET: 223 MS
QRS COUNT: 10 BEATS
QRS DURATION: 98 MS
QT INTERVAL: 426 MS
QTC CALCULATION(BAZETT): 426 MS
QTC FREDERICIA: 426 MS
R AXIS: 28 DEGREES
T AXIS: 56 DEGREES
T OFFSET: 436 MS
VENTRICULAR RATE: 60 BPM

## 2025-07-08 ENCOUNTER — OFFICE VISIT (OUTPATIENT)
Dept: CARDIOLOGY | Facility: CLINIC | Age: 85
End: 2025-07-08
Payer: MEDICARE

## 2025-07-08 VITALS
SYSTOLIC BLOOD PRESSURE: 122 MMHG | BODY MASS INDEX: 29.29 KG/M2 | OXYGEN SATURATION: 96 % | WEIGHT: 176 LBS | DIASTOLIC BLOOD PRESSURE: 50 MMHG | HEART RATE: 46 BPM

## 2025-07-08 DIAGNOSIS — I35.1 MODERATE AORTIC INSUFFICIENCY: ICD-10-CM

## 2025-07-08 DIAGNOSIS — R00.1 BRADYCARDIA, SINUS: Primary | ICD-10-CM

## 2025-07-08 DIAGNOSIS — I25.10 CORONARY ARTERY CALCIFICATION: ICD-10-CM

## 2025-07-08 DIAGNOSIS — E78.00 PURE HYPERCHOLESTEROLEMIA: ICD-10-CM

## 2025-07-08 DIAGNOSIS — I10 HTN (HYPERTENSION), BENIGN: ICD-10-CM

## 2025-07-08 DIAGNOSIS — E78.1 HYPERTRIGLYCERIDEMIA: ICD-10-CM

## 2025-07-08 DIAGNOSIS — I71.20 THORACIC AORTIC ANEURYSM WITHOUT RUPTURE, UNSPECIFIED PART: ICD-10-CM

## 2025-07-08 DIAGNOSIS — G47.33 OSA (OBSTRUCTIVE SLEEP APNEA): ICD-10-CM

## 2025-07-08 PROBLEM — D53.9 NUTRITIONAL ANEMIA, UNSPECIFIED: Status: RESOLVED | Noted: 2020-04-17 | Resolved: 2025-07-08

## 2025-07-08 PROBLEM — G47.31 PRIMARY CENTRAL SLEEP APNEA: Status: ACTIVE | Noted: 2025-05-12

## 2025-07-08 PROBLEM — N40.0 BENIGN PROSTATIC HYPERPLASIA: Status: RESOLVED | Noted: 2021-05-28 | Resolved: 2025-07-08

## 2025-07-08 PROBLEM — M94.0 COSTOCHONDRITIS: Status: RESOLVED | Noted: 2022-04-21 | Resolved: 2025-07-08

## 2025-07-08 PROBLEM — E66.811 CLASS 1 OBESITY WITH BODY MASS INDEX (BMI) OF 30.0 TO 30.9 IN ADULT: Status: RESOLVED | Noted: 2024-12-19 | Resolved: 2025-07-08

## 2025-07-08 PROBLEM — R35.89 POLYURIA: Status: RESOLVED | Noted: 2022-01-14 | Resolved: 2025-07-08

## 2025-07-08 PROBLEM — R21 PENILE RASH: Status: RESOLVED | Noted: 2022-04-21 | Resolved: 2025-07-08

## 2025-07-08 LAB
Q ONSET: 224 MS
QRS COUNT: 8 BEATS
QRS DURATION: 94 MS
QT INTERVAL: 432 MS
QTC CALCULATION(BAZETT): 378 MS
QTC FREDERICIA: 395 MS
R AXIS: 54 DEGREES
T AXIS: 57 DEGREES
T OFFSET: 440 MS
VENTRICULAR RATE: 46 BPM

## 2025-07-08 PROCEDURE — 93010 ELECTROCARDIOGRAM REPORT: CPT | Performed by: INTERNAL MEDICINE

## 2025-07-08 PROCEDURE — G2211 COMPLEX E/M VISIT ADD ON: HCPCS | Performed by: INTERNAL MEDICINE

## 2025-07-08 PROCEDURE — 1159F MED LIST DOCD IN RCRD: CPT | Performed by: INTERNAL MEDICINE

## 2025-07-08 PROCEDURE — 93005 ELECTROCARDIOGRAM TRACING: CPT | Performed by: INTERNAL MEDICINE

## 2025-07-08 PROCEDURE — 99212 OFFICE O/P EST SF 10 MIN: CPT

## 2025-07-08 PROCEDURE — 3078F DIAST BP <80 MM HG: CPT | Performed by: INTERNAL MEDICINE

## 2025-07-08 PROCEDURE — 99214 OFFICE O/P EST MOD 30 MIN: CPT | Performed by: INTERNAL MEDICINE

## 2025-07-08 PROCEDURE — 3074F SYST BP LT 130 MM HG: CPT | Performed by: INTERNAL MEDICINE

## 2025-07-08 PROCEDURE — 1036F TOBACCO NON-USER: CPT | Performed by: INTERNAL MEDICINE

## 2025-07-08 NOTE — PROGRESS NOTES
Chief Complaint:   6 month follow up , Bradycardia, Hypertension, and Palpitations     History Of Present Illness:    Sabina Henry is a 85 y.o. male presenting with  CV dz.  Went to ED with palpitations 6/14/25=EKG with HR 60  Active-2 miles daily  Feels good    Patient denies chest pain/SOB//dizziness/lightheadedness/edema/claudication       Last Recorded Vitals:  Vitals:    07/08/25 1005 07/08/25 1026   BP: 126/50 122/50   BP Location: Left arm    Patient Position: Sitting    BP Cuff Size: Adult    Pulse: (!) 46    SpO2: 96%    Weight: 79.8 kg (176 lb)             Allergies:  Penicillins    Outpatient Medications:  Current Outpatient Medications   Medication Instructions    amLODIPine (NORVASC) 10 mg, Daily    aspirin 81 mg EC tablet 4 tablets, Once Weekly    colesevelam (WELCHOL) 1,250 mg, oral, 2 times daily    olmesartan (BENICAR) 40 mg, Daily    omega-3 acid ethyl esters (LOVAZA) 2 g, oral, 2 times daily       Physical Exam:  Constitutional:       Appearance: Healthy appearance. Not in distress.   Neck:      Vascular: No JVR. JVD normal.   Pulmonary:      Effort: Pulmonary effort is normal.      Breath sounds: Normal breath sounds. No wheezing. No rhonchi. No rales.   Chest:      Chest wall: Not tender to palpatation.   Cardiovascular:      PMI at left midclavicular line. Bradycardia present. Occasional ectopic beats. Regular rhythm. Normal S1. Normal S2.       Murmurs: There is no murmur.      No gallop.  No click. No rub.   Pulses:     Intact distal pulses.   Edema:     Peripheral edema absent.   Abdominal:      General: Bowel sounds are normal.      Palpations: Abdomen is soft.      Tenderness: There is no abdominal tenderness.   Musculoskeletal: Normal range of motion.         General: No tenderness. Skin:     General: Skin is warm and dry.   Neurological:      General: No focal deficit present.      Mental Status: Alert and oriented to person, place and time.          Last Labs:  CBC -  Lab Results    Component Value Date    WBC 6.6 06/15/2025    HGB 13.8 06/15/2025    HCT 41.3 06/15/2025    MCV 92 06/15/2025     06/15/2025       CMP -  Lab Results   Component Value Date    CALCIUM 9.6 06/15/2025    PROT 7.8 06/15/2025    ALBUMIN 4.6 06/15/2025    AST 18 06/15/2025    ALT 11 06/15/2025    ALKPHOS 79 06/15/2025    BILITOT 0.6 06/15/2025       LIPID PANEL -   Lab Results   Component Value Date    CHOL 167 05/17/2021    TRIG 192 (H) 05/17/2021    HDL 40.9 05/17/2021    CHHDL 4.1 05/17/2021    LDLF 88 05/17/2021    VLDL 38 05/17/2021 11/2024  T=204  Hdl=43  Xe=268  Tsy=814    RENAL FUNCTION PANEL -   Lab Results   Component Value Date    GLUCOSE 118 (H) 06/15/2025     (L) 06/15/2025    K 4.3 06/15/2025    CL 97 (L) 06/15/2025    CO2 26 06/15/2025    ANIONGAP 11 06/15/2025    BUN 16 06/15/2025    CREATININE 1.06 06/15/2025    CALCIUM 9.6 06/15/2025    ALBUMIN 4.6 06/15/2025        Lab Results   Component Value Date     (H) 06/15/2025           Lab review: I have personally reviewed the laboratory result(s)       Problem List Items Addressed This Visit       Bradycardia, sinus - Primary    Overview   Patient with episode of Mobitz II AV block on event monitor. Had pauses up to 4.5 seconds. Occurred at night. Suspect related to sleep apnea.   Decreased beta blocker   Started treatment for sleep apnea    EKG today-7/8/25-with Wenckebach rhythm-asymptomatic but HR 46-STOP metoprolol           Coronary artery calcification    Overview   Noted initially on 4/2020 chest CT. Reported as severe on 4/2021 chest CT.  NL 6/2021 stress test.   Denies angina despite good activity level.   Stopping  beta blocker with low HR  On ASA.   Has been statin intolerant.  Follow.         HTN (hypertension), benign    Overview   BP OK  Had stopped hydrochlorothiazide in the past b/c of dehydration / hematuria  Follow          Relevant Orders    ECG 12 lead (Clinic Performed) (Completed)    Hyperlipidemia    Overview    Has not tolerated statins - on Welchol and Lovaza ... Declined change in statin in past   4/2022 KZQMO=140 with BAO=166   12/2022 NEKOQ=033, HDL=48  5/2024 screening with KZVVG=422  11/2024 TOF=935  Weight loss/HH diet         Hypertriglyceridemia    Overview   On Lovaza ... 4/2022 OX=207  11/2024 UE=651  Needs weight loss         Moderate aortic insufficiency    Overview   Noted on 8/2017 echo. Stable per 4/2021 study.  8/2023 echo with NL LV size/systolic fxn and moderate AI  No murmur on exam         LYN (obstructive sleep apnea)    Overview   Severe per 5/2021 sleep study  Wearing CPAP         Thoracic aortic aneurysm (TAA)    Overview   4.1 CM per 4/2021 CT  Stable at 4.1 cm per 8/2023 echo  Follow             Stay active  Weight loss  STOP Metoprolol as HR LOW    Santy Awad, DO
